# Patient Record
Sex: FEMALE | Race: WHITE | NOT HISPANIC OR LATINO | Employment: FULL TIME | URBAN - METROPOLITAN AREA
[De-identification: names, ages, dates, MRNs, and addresses within clinical notes are randomized per-mention and may not be internally consistent; named-entity substitution may affect disease eponyms.]

---

## 2021-05-25 ENCOUNTER — RECORDS - HEALTHEAST (OUTPATIENT)
Dept: ADMINISTRATIVE | Facility: CLINIC | Age: 61
End: 2021-05-25

## 2021-09-15 ENCOUNTER — OFFICE VISIT (OUTPATIENT)
Dept: OPHTHALMOLOGY | Facility: CLINIC | Age: 61
End: 2021-09-15
Payer: COMMERCIAL

## 2021-09-15 DIAGNOSIS — H52.4 PRESBYOPIA: ICD-10-CM

## 2021-09-15 DIAGNOSIS — Z01.01 ENCOUNTER FOR EXAMINATION OF EYES AND VISION WITH ABNORMAL FINDINGS: ICD-10-CM

## 2021-09-15 DIAGNOSIS — H04.201 RIGHT EPIPHORA: Primary | ICD-10-CM

## 2021-09-15 PROBLEM — K63.5 POLYP OF COLON: Status: ACTIVE | Noted: 2021-09-15

## 2021-09-15 PROCEDURE — 92015 DETERMINE REFRACTIVE STATE: CPT | Performed by: OPHTHALMOLOGY

## 2021-09-15 PROCEDURE — 92004 COMPRE OPH EXAM NEW PT 1/>: CPT | Performed by: OPHTHALMOLOGY

## 2021-09-15 ASSESSMENT — REFRACTION_WEARINGRX
OD_SPHERE: -1.50
OD_CYLINDER: SPHERE
OS_AXIS: 133
OS_CYLINDER: +0.50
SPECS_TYPE: SVL
OS_SPHERE: -1.50

## 2021-09-15 ASSESSMENT — EXTERNAL EXAM - LEFT EYE: OS_EXAM: NORMAL

## 2021-09-15 ASSESSMENT — REFRACTION_MANIFEST
OS_CYLINDER: +0.50
OD_SPHERE: -1.50
OS_AXIS: 140
OD_AXIS: 001
OS_ADD: +2.50
OD_ADD: +2.50
OD_CYLINDER: +0.50
OS_SPHERE: -1.25

## 2021-09-15 ASSESSMENT — CONF VISUAL FIELD
OD_NORMAL: 1
OS_NORMAL: 1

## 2021-09-15 ASSESSMENT — VISUAL ACUITY
OD_CC+: +2
OS_CC: 20/20
OD_CC: 20/30
OD_SC: 1
OS_SC: 2
CORRECTION_TYPE: GLASSES
METHOD: SNELLEN - LINEAR

## 2021-09-15 ASSESSMENT — CUP TO DISC RATIO
OD_RATIO: 0.3
OS_RATIO: 0.3

## 2021-09-15 ASSESSMENT — TONOMETRY
OD_IOP_MMHG: 19
IOP_METHOD: APPLANATION
OS_IOP_MMHG: 21

## 2021-09-15 ASSESSMENT — SLIT LAMP EXAM - LIDS: COMMENTS: 1+ MEIBOMIAN GLAND DYSFUNCTION

## 2021-09-15 ASSESSMENT — EXTERNAL EXAM - RIGHT EYE: OD_EXAM: NORMAL

## 2021-09-15 NOTE — PROGRESS NOTES
" Current Eye Medications:  None.     Subjective:  Comprehensive Eye Exam.  Frequently her right eye \"tears-up,\" especially her right lateral canthus area.  She tried a regimen of steroid drops and lid hygiene, but no improvement.  No redness or mattering.  Vision is good in each eye and she does well overall without correction.  She has prescription glasses that she wears for driving.    History of contact lens monovision (right eye only, distance correction).    No history of eye injuries or surgery.  Mother has macular degeneration and gets injections (she is a patient of Dr. Stacy Giang.  Cousin of Rolando Gerard).      Objective:  See Ophthalmology Exam.       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      ICD-10-CM    1. Right epiphora  H04.201    2. Encounter for examination of eyes and vision with abnormal findings  Z01.01 EYE EXAM (SIMPLE-NONBILLABLE)   3. Presbyopia  H52.4 REFRACTIVE STATUS     EYE EXAM (SIMPLE-NONBILLABLE)        Plan:  Could try a +1.25 OTC reader.  Glasses Rx given - optional   May use artificial tears up to 4 times daily both eyes. (Refresh Tears, Systane Ultra/Balance, or Theratears)   Warm packs for 10 minutes twice daily.   Call in May 2022 for an appointment in September 2022 for Complete Exam    Dr. Kumar [phone]       "

## 2021-09-15 NOTE — PATIENT INSTRUCTIONS
Could try a +1.25 OTC reader.  Glasses Rx given - optional   May use artificial tears up to 4 times daily both eyes. (Refresh Tears, Systane Ultra/Balance, or Theratears)   Warm packs for 10 minutes twice daily.   Call in May 2022 for an appointment in September 2022 for Complete Exam    Dr. Kumar [phone]

## 2021-09-15 NOTE — LETTER
"    9/15/2021         RE: Marisol Johnson  [address] [facility] [address]        Dear Colleague,    Thank you for referring your patient, Marisol Johnson, to the [facility]. Please see a copy of my visit note below.     Current Eye Medications:  None.     Subjective:  Comprehensive Eye Exam.  Frequently her right eye \"tears-up,\" especially her right lateral canthus area.  She tried a regimen of steroid drops and lid hygiene, but no improvement.  No redness or mattering.  Vision is good in each eye and she does well overall without correction.  She has prescription glasses that she wears for driving.    History of contact lens monovision (right eye only, distance correction).    No history of eye injuries or surgery.  Mother has macular degeneration and gets injections (she is a patient of Dr. Stacy Giang.  Cousin of Rolando Gerard).      Objective:  See Ophthalmology Exam.       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      ICD-10-CM    1. Right epiphora  H04.201    2. Encounter for examination of eyes and vision with abnormal findings  Z01.01 EYE EXAM (SIMPLE-NONBILLABLE)   3. Presbyopia  H52.4 REFRACTIVE STATUS     EYE EXAM (SIMPLE-NONBILLABLE)        Plan:  Could try a +1.25 OTC reader.  Glasses Rx given - optional   May use artificial tears up to 4 times daily both eyes. (Refresh Tears, Systane Ultra/Balance, or Theratears)   Warm packs for 10 minutes twice daily.   Call in May 2022 for an appointment in September 2022 for Complete Exam    Dr. Kumar [phone]           Again, thank you for allowing me to participate in the care of your patient.        Sincerely,        Nj Kumar MD    "

## 2023-12-12 ENCOUNTER — LAB REQUISITION (OUTPATIENT)
Dept: LAB | Facility: CLINIC | Age: 63
End: 2023-12-12
Payer: COMMERCIAL

## 2023-12-12 DIAGNOSIS — D48.5 NEOPLASM OF UNCERTAIN BEHAVIOR OF SKIN: ICD-10-CM

## 2023-12-12 PROCEDURE — 88305 TISSUE EXAM BY PATHOLOGIST: CPT | Performed by: DERMATOLOGY

## 2023-12-14 LAB
PATH REPORT.COMMENTS IMP SPEC: NORMAL
PATH REPORT.COMMENTS IMP SPEC: NORMAL
PATH REPORT.FINAL DX SPEC: NORMAL
PATH REPORT.GROSS SPEC: NORMAL
PATH REPORT.MICROSCOPIC SPEC OTHER STN: NORMAL
PATH REPORT.RELEVANT HX SPEC: NORMAL

## 2024-11-12 ENCOUNTER — OFFICE VISIT (OUTPATIENT)
Dept: OPHTHALMOLOGY | Facility: CLINIC | Age: 64
End: 2024-11-12
Payer: COMMERCIAL

## 2024-11-12 DIAGNOSIS — H04.201 RIGHT EPIPHORA: Primary | ICD-10-CM

## 2024-11-12 PROCEDURE — 92002 INTRM OPH EXAM NEW PATIENT: CPT | Performed by: OPHTHALMOLOGY

## 2024-11-12 RX ORDER — FLUOROMETHOLONE 1 MG/ML
1 SUSPENSION/ DROPS OPHTHALMIC 2 TIMES DAILY PRN
Qty: 5 ML | Refills: 2 | Status: SHIPPED | OUTPATIENT
Start: 2024-11-12

## 2024-11-12 ASSESSMENT — TONOMETRY
IOP_METHOD: ICARE
OS_IOP_MMHG: 15
OD_IOP_MMHG: 14

## 2024-11-12 ASSESSMENT — VISUAL ACUITY
OD_CC: 20/20
CORRECTION_TYPE: GLASSES
OS_CC+: -2
OS_CC: 20/20
OD_CC+: -1
METHOD: SNELLEN - LINEAR

## 2024-11-12 ASSESSMENT — SLIT LAMP EXAM - LIDS: COMMENTS: 1+ MEIBOMIAN GLAND DYSFUNCTION

## 2024-11-12 ASSESSMENT — EXTERNAL EXAM - RIGHT EYE: OD_EXAM: NORMAL

## 2024-11-12 ASSESSMENT — EXTERNAL EXAM - LEFT EYE: OS_EXAM: NORMAL

## 2024-11-12 NOTE — PROGRESS NOTES
" Current Eye Medications:  none.       Subjective:  the patient complains of epiphora only in her right eye - the tears run out of the lateral canthus area.  She had two previous eye infections in the lateral canthus area.  When she touches her lateral canthus area, it feels scratchy.  She also describes a \"film\" over her right eye which she blinks away.  Yesterday, she noticed increased stinging mucous right eye.  Woke with a tight feeling in her right eye.  Overall, vision appears to be about the same.       Objective:  See Ophthalmology Exam.       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      Plan:  Begin:   - Artificial tears up to four times a day (like Refresh Optive, Systane Balance, or TheraTears. Avoid \"get the red out\" drops and generic artifical tears).   - Gel drops in both eyes at bedtime (Refresh Celluvisc, Systane gel, Genteal gel tears, Refresh PM ointment)     If no improvement after a few weeks, Begin:  FML drops in the right eye 2-3 times a day as needed     If problems persist for a few more weeks, can send a referral to Dr. Cyndy Olvera for tearing evaluation.     Nj Kumar M.D.  [phone]       "

## 2024-11-12 NOTE — LETTER
"11/12/2024      Marisol Johnson  [address] [facility] [address]      Dear Colleague,    Thank you for referring your patient, Marisol Johnson, to the [facility]. Please see a copy of my visit note below.     Current Eye Medications:  none.       Subjective:  the patient complains of epiphora only in her right eye - the tears run out of the lateral canthus area.  She had two previous eye infections in the lateral canthus area.  When she touches her lateral canthus area, it feels scratchy.  She also describes a \"film\" over her right eye which she blinks away.  Yesterday, she noticed increased stinging mucous right eye.  Woke with a tight feeling in her right eye.  Overall, vision appears to be about the same.       Objective:  See Ophthalmology Exam.       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      Plan:  Begin:   - Artificial tears up to four times a day (like Refresh Optive, Systane Balance, or TheraTears. Avoid \"get the red out\" drops and generic artifical tears).   - Gel drops in both eyes at bedtime (Refresh Celluvisc, Systane gel, Genteal gel tears, Refresh PM ointment)     If no improvement after a few weeks, Begin:  FML drops in the right eye 2-3 times a day as needed     If problems persist for a few more weeks, can send a referral to Dr. Cyndy Olvera for tearing evaluation.     Nj Kumar M.D.  [phone]         Again, thank you for allowing me to participate in the care of your patient.        Sincerely,        Nj Kumar MD  "

## 2024-11-12 NOTE — PATIENT INSTRUCTIONS
"Begin:   - Artificial tears up to four times a day (like Refresh Optive, Systane Balance, or TheraTears. Avoid \"get the red out\" drops and generic artifical tears).   - Gel drops in both eyes at bedtime (Refresh Celluvisc, Systane gel, Genteal gel tears, Refresh PM ointment)     If no improvement after a few weeks, Begin:  FML drops in the right eye 2-3 times a day as needed     If problems persist for a few more weeks, can send a referral to Dr. Cyndy Olvera for tearing evaluation.     Nj Kumar M.D.  [phone]        "